# Patient Record
Sex: MALE | Race: WHITE | Employment: FULL TIME | URBAN - METROPOLITAN AREA
[De-identification: names, ages, dates, MRNs, and addresses within clinical notes are randomized per-mention and may not be internally consistent; named-entity substitution may affect disease eponyms.]

---

## 2017-07-07 ENCOUNTER — TELEPHONE (OUTPATIENT)
Dept: FAMILY MEDICINE CLINIC | Facility: CLINIC | Age: 42
End: 2017-07-07

## 2017-07-07 NOTE — TELEPHONE ENCOUNTER
FERNANDO for pt to CB. Pt was in the urgent care at [facility] on 07/04/2017 for left knee redness. He was diagnosed with cellulitis. Dr. Gael Larson would like an update on his condition.

## 2017-07-10 NOTE — TELEPHONE ENCOUNTER
Call to pt's cell reaches identified voice mail. Per hipaa consent, left vmm advising dr Amirah Torres req call back to triage nurse tomorrow with condition update since seen at [facility] 7/4/17. Provided ofc phone hours.

## 2017-07-14 NOTE — TELEPHONE ENCOUNTER
TPhilc. To pt. Cell # [phone]. Pt states\" the bump on my knee is gone. There is no redness or pain. I have about 2 days of the antibiotic  left. \" I advised him to finish the antibiotic and if he has any return of symptoms, questions or concerns please c

## 2017-12-23 ENCOUNTER — HOSPITAL ENCOUNTER (EMERGENCY)
Facility: HOSPITAL | Age: 42
Discharge: HOME | End: 2017-12-23
Attending: EMERGENCY MEDICINE
Payer: COMMERCIAL

## 2017-12-23 ENCOUNTER — APPOINTMENT (OUTPATIENT)
Dept: GENERAL RADIOLOGY | Facility: HOSPITAL | Age: 42
End: 2017-12-23
Payer: COMMERCIAL

## 2017-12-23 VITALS
HEART RATE: 61 BPM | BODY MASS INDEX: 29.52 KG/M2 | DIASTOLIC BLOOD PRESSURE: 98 MMHG | HEIGHT: 77 IN | SYSTOLIC BLOOD PRESSURE: 125 MMHG | RESPIRATION RATE: 18 BRPM | TEMPERATURE: 98 F | OXYGEN SATURATION: 100 % | WEIGHT: 250 LBS

## 2017-12-23 DIAGNOSIS — S93.141A: Primary | ICD-10-CM

## 2017-12-23 PROCEDURE — 99283 EMERGENCY DEPT VISIT LOW MDM: CPT

## 2017-12-23 PROCEDURE — 73630 X-RAY EXAM OF FOOT: CPT | Performed by: EMERGENCY MEDICINE

## 2017-12-23 NOTE — ED PROVIDER NOTES
Patient Seen in: [facility] Emergency Department    History   Patient presents with:  Lower Extremity Injury (musculoskeletal)    Stated Complaint: \"toe dislocation\"     HPI    Patient is a pleasant 47-year-old male, presenting for evaluation of r the first MTP joint. No obvious dislocation. Neurological: He is alert and oriented to person, place, and time. Skin: Skin is warm and dry. Psychiatric: He has a normal mood and affect.  His behavior is normal. Judgment and thought content normal. and ambulate, which he was unable to do upon arrival.    Suspected toe subluxation reviewed. Supportive care instructions reviewed. Patient should take ibuprofen and ice/elevate his foot when able.   Careful weightbearing may be continued as tolerated

## 2018-01-10 ENCOUNTER — OFFICE VISIT (OUTPATIENT)
Dept: FAMILY MEDICINE CLINIC | Facility: CLINIC | Age: 43
End: 2018-01-10

## 2018-01-10 VITALS
OXYGEN SATURATION: 97 % | WEIGHT: 250 LBS | BODY MASS INDEX: 28.93 KG/M2 | RESPIRATION RATE: 16 BRPM | DIASTOLIC BLOOD PRESSURE: 70 MMHG | SYSTOLIC BLOOD PRESSURE: 110 MMHG | HEART RATE: 107 BPM | HEIGHT: 78 IN | TEMPERATURE: 102 F

## 2018-01-10 DIAGNOSIS — R68.89 INFLUENZA-LIKE SYMPTOMS: Primary | ICD-10-CM

## 2018-01-10 DIAGNOSIS — H65.192 OTHER ACUTE NONSUPPURATIVE OTITIS MEDIA OF LEFT EAR, RECURRENCE NOT SPECIFIED: ICD-10-CM

## 2018-01-10 PROCEDURE — 99202 OFFICE O/P NEW SF 15 MIN: CPT | Performed by: NURSE PRACTITIONER

## 2018-01-10 RX ORDER — OSELTAMIVIR PHOSPHATE 75 MG/1
75 CAPSULE ORAL 2 TIMES DAILY
Qty: 10 CAPSULE | Refills: 0 | Status: SHIPPED | OUTPATIENT
Start: 2018-01-10 | End: 2018-01-15

## 2018-01-10 RX ORDER — AMOXICILLIN 875 MG/1
875 TABLET, COATED ORAL 2 TIMES DAILY
Qty: 20 TABLET | Refills: 0 | Status: SHIPPED | OUTPATIENT
Start: 2018-01-10 | End: 2018-01-20

## 2018-01-10 NOTE — PATIENT INSTRUCTIONS
· It is very important to complete full course of antibiotic. · Tamiflu as prescribed. · Lots of fluids and rest  · Robitussin DM for cough as packet insert  · May also try Flonase OTC for nasal symptoms as packet insert.    · Acetaminophen or ibuprofen Antibiotic medicine is a common treatment for ear infections. However, recent studies have shown that the symptoms of ear infections often go away in a couple of days without antibiotics.  Bacteria can become resistant to antibiotics, and the medicine may c If you have a fever:   Rest until your temperature has fallen below 100°F (37.8°C). Then become as active as is comfortable. Ask your provider if you can take aspirin, acetaminophen, or ibuprofen to control your fever.  Anyone under the age of 24 with a v The flu starts 1 to 3 days after you are exposed to the flu virus. It may last for 1 to 2 weeks but many people feel tired or fatigued for many weeks afterward. You usually don’t need to take antibiotics unless you have a complication.  This might be an ear · Cough with lots of colored mucus (sputum) or blood in your mucus  · Chest pain, shortness of breath, wheezing, or trouble breathing  · Severe headache, or face, neck, or ear pain  · New rash with fever  · Fever of 100.4°F (38°C) or higher, or as directed

## 2018-01-10 NOTE — PROGRESS NOTES
Patient presents with:  Flu  :    HPI:   Da Sarmiento is a 38year old male who presents for upper respiratory symptoms for  3  days. Started suddenly. Patient originally started taking left over Tamilfu and had 3 pills.   He left for his job GENERAL: well developed, well nourished, in no apparent distress, acutely sick. SKIN: no rashes,no suspicious lesions, flushed. Warm to touch.   HEAD: atraumatic, normocephalic,  no tenderness on palpation of sinuses  EYES: conjunctiva clear, EOM intact  E - Oseltamivir Phosphate 75 MG Oral Cap; Take 1 capsule (75 mg total) by mouth 2 (two) times daily. Dispense: 10 capsule; Refill: 0    2. Other acute nonsuppurative otitis media of left ear, recurrence not specified  Likely secondary to flu symptoms.   Medi Ear infections usually begin with a viral infection of the nose and throat. For example, a cold might lead to an infection of the ear. Ear infections may also occur when you have allergies.  The viral infection or allergic reaction can cause swelling of the How long will the effects last?   In most cases you should feel better in 2 to 3 days.    If you are taking an antibiotic and your eardrum has not returned to normal when your provider examines you again, you may need to take a different antibiotic or other Keep all your appointments. Your healthcare provider may want you to have one or more follow-up exams until signs of inflammation and infection have disappeared. How can I prevent an ear infection from occurring?    If you tend to get ear infections often · Nausea and loss of appetite are common with the flu. Eat light meals. Drink 6 to 8 glasses of liquids every day. Good choices are water, sport drinks, soft drinks without caffeine, juices, tea, and soup.  Extra fluids will also help loosen secretions in y

## 2019-04-09 ENCOUNTER — TELEPHONE (OUTPATIENT)
Dept: FAMILY MEDICINE CLINIC | Facility: CLINIC | Age: 44
End: 2019-04-09

## 2019-04-09 ENCOUNTER — APPOINTMENT (OUTPATIENT)
Dept: CT IMAGING | Facility: HOSPITAL | Age: 44
End: 2019-04-09
Attending: EMERGENCY MEDICINE
Payer: COMMERCIAL

## 2019-04-09 ENCOUNTER — HOSPITAL ENCOUNTER (EMERGENCY)
Facility: HOSPITAL | Age: 44
Discharge: HOME | End: 2019-04-09
Attending: EMERGENCY MEDICINE
Payer: COMMERCIAL

## 2019-04-09 VITALS
BODY MASS INDEX: 30.31 KG/M2 | TEMPERATURE: 98 F | WEIGHT: 262 LBS | HEART RATE: 60 BPM | HEIGHT: 78 IN | OXYGEN SATURATION: 98 % | SYSTOLIC BLOOD PRESSURE: 129 MMHG | RESPIRATION RATE: 16 BRPM | DIASTOLIC BLOOD PRESSURE: 79 MMHG

## 2019-04-09 DIAGNOSIS — R51.9 ACUTE NONINTRACTABLE HEADACHE, UNSPECIFIED HEADACHE TYPE: Primary | ICD-10-CM

## 2019-04-09 PROCEDURE — 99284 EMERGENCY DEPT VISIT MOD MDM: CPT

## 2019-04-09 PROCEDURE — 36415 COLL VENOUS BLD VENIPUNCTURE: CPT

## 2019-04-09 PROCEDURE — 80053 COMPREHEN METABOLIC PANEL: CPT | Performed by: EMERGENCY MEDICINE

## 2019-04-09 PROCEDURE — 70496 CT ANGIOGRAPHY HEAD: CPT | Performed by: EMERGENCY MEDICINE

## 2019-04-09 PROCEDURE — 85025 COMPLETE CBC W/AUTO DIFF WBC: CPT | Performed by: EMERGENCY MEDICINE

## 2019-04-09 PROCEDURE — 99285 EMERGENCY DEPT VISIT HI MDM: CPT

## 2019-04-09 PROCEDURE — 70450 CT HEAD/BRAIN W/O DYE: CPT | Performed by: EMERGENCY MEDICINE

## 2019-04-09 PROCEDURE — 85730 THROMBOPLASTIN TIME PARTIAL: CPT | Performed by: EMERGENCY MEDICINE

## 2019-04-09 PROCEDURE — 85610 PROTHROMBIN TIME: CPT | Performed by: EMERGENCY MEDICINE

## 2019-04-09 NOTE — ED NOTES
Pt reevaluated by er physician, informed pt of all his test reports and plan of care.  Pt verbalizing understanding

## 2019-04-09 NOTE — TELEPHONE ENCOUNTER
Spoke to wife Maurice Anne that pt needs to be evaluated in the ER for thunderclap h/a     With verbalized understanding

## 2019-04-09 NOTE — ED PROVIDER NOTES
Patient Seen in: [facility] Emergency Department    History   Patient presents with:  Headache (neurologic)    Stated Complaint: sudden headache that started yesterday. A&OX 4. Ambulatory. No trauma or injury.     HPI    Patient is a 46-year-old male Pulmonary/Chest: Effort normal and breath sounds normal.   Abdominal: Soft. Bowel sounds are normal.   Musculoskeletal: Normal range of motion. Neurological: He is alert and oriented to person, place, and time. Skin: Skin is warm and dry.    Psychiatr completely gone away but is much better. Will check basic labs, CT brain to evaluate for intracranial hemorrhage. Discussed with the patient and his wife that we may need to rule out subarachnoid hemorrhage.        Update at 2:45 PM.  CT brain is negative

## 2019-04-09 NOTE — ED INITIAL ASSESSMENT (HPI)
Pt states he had an episode of sudden onset of severe headache yesterday at noon that lasted for approx 3 minutes and went away. just a mild headache at this time. No injury.

## 2019-04-11 ENCOUNTER — OFFICE VISIT (OUTPATIENT)
Dept: NEUROLOGY | Facility: CLINIC | Age: 44
End: 2019-04-11
Payer: COMMERCIAL

## 2019-04-11 VITALS
SYSTOLIC BLOOD PRESSURE: 131 MMHG | BODY MASS INDEX: 30.31 KG/M2 | RESPIRATION RATE: 16 BRPM | DIASTOLIC BLOOD PRESSURE: 85 MMHG | WEIGHT: 262 LBS | HEART RATE: 71 BPM | HEIGHT: 78 IN

## 2019-04-11 DIAGNOSIS — G44.82 HEADACHE ASSOCIATED WITH SEXUAL ACTIVITY: Primary | ICD-10-CM

## 2019-04-11 PROCEDURE — 1111F DSCHRG MED/CURRENT MED MERGE: CPT | Performed by: OTHER

## 2019-04-11 PROCEDURE — 99204 OFFICE O/P NEW MOD 45 MIN: CPT | Performed by: OTHER

## 2019-04-11 RX ORDER — INDOMETHACIN 25 MG/1
25 CAPSULE ORAL
Qty: 90 CAPSULE | Refills: 0 | Status: SHIPPED | OUTPATIENT
Start: 2019-04-11

## 2019-04-11 NOTE — H&P
[facility] New Patient / Consult Visit    Manuel Ding is a 41year old male.                          Referring MD: None  Patient presents with:  Hospital F/U: Headaches during intercourse      HPI:    Manuel Ding is a 41 recent weight change, fevers, chills, nausea, double vision/ blurry vision / loss of vision, chest pain, palpitations, shortness of breath, rashes, joint pains, bowel / bladder incontinence or mood issues.      Past Medical History:   Diagnosis Date   • Vis Pupils: equally round and reactive to light with direct and consensual responses, normal accomodation   Visual acuity: Normal              Visual fields: Normal  Oculomotor/Trochlear/Abducens:    Eye Movements: EOMI without nystagmus  Trigeminal:   Faci are unremarkable. The basilar artery has a normal course and caliber. The bilateral vertebral arteries are unremarkable. The origins of the bilateral PICA are seen.         =====  CONCLUSION:    1. No acute intracranial process.   2. No high-grade stenos Sodium      136 - 145 mmol/L 140   Potassium      3.5 - 5.1 mmol/L 4.2   Chloride      98 - 112 mmol/L 107   Carbon Dioxide, Total      21.0 - 32.0 mmol/L 26.0   ANION GAP      0 - 18 mmol/L 7   BUN      7 - 18 mg/dL 14   CREATININE      0.70 - 1.30 mg/d with the patient and family, and recommended patient trial indomethacin 25-50 mg 1 hour prior to sexual activity. If this does not improve headaches, patient may try 25 mg 3 times daily for prevention purposes.   Patient was advised if he is taking indomet

## 2019-05-08 ENCOUNTER — OFFICE VISIT (OUTPATIENT)
Dept: NEUROLOGY | Facility: CLINIC | Age: 44
End: 2019-05-08
Payer: COMMERCIAL

## 2019-05-08 VITALS
HEART RATE: 62 BPM | WEIGHT: 262 LBS | RESPIRATION RATE: 16 BRPM | DIASTOLIC BLOOD PRESSURE: 80 MMHG | BODY MASS INDEX: 30.31 KG/M2 | HEIGHT: 78 IN | SYSTOLIC BLOOD PRESSURE: 111 MMHG

## 2019-05-08 DIAGNOSIS — G44.82 HEADACHE ASSOCIATED WITH SEXUAL ACTIVITY: Primary | ICD-10-CM

## 2019-05-08 DIAGNOSIS — R47.89 WORD FINDING DIFFICULTY: ICD-10-CM

## 2019-05-08 DIAGNOSIS — R41.0 TRANSIENT CONFUSION: ICD-10-CM

## 2019-05-08 PROCEDURE — 99214 OFFICE O/P EST MOD 30 MIN: CPT | Performed by: OTHER

## 2019-05-08 NOTE — PROGRESS NOTES
[facility] Progress Note    HPI  Patient presents with:  Headache: Follow up      As per my initial H&P from 4/11/19:,   \" Gillian Torres is a 41year old, who presents for evaluation of headaches.       Patient states he was in [place] vision, chest pain, palpitations, shortness of breath, rashes, joint pains, bowel / bladder incontinence or mood issues.  \"        Patient since last visit has been doing better overall - had persistent headaches for about a week after last visit but this kg/m²   Estimated body mass index is 30.28 kg/m² as calculated from the following:    Height as of this encounter: 78\". Weight as of this encounter: 262 lb.     Gen: well developed, well nourished, no acute distress  HEENT: normocephalic  Heart; normal If this does not improve headaches, patient may try 25 mg 3 times daily for prevention purposes. Patient was advised if he is taking indomethacin on a regular basis, he should minimize use of other NSAIDs, due to increased risk for GI discomfort/ulcers.

## 2023-03-02 ENCOUNTER — TELEPHONE (OUTPATIENT)
Dept: FAMILY MEDICINE CLINIC | Facility: CLINIC | Age: 48
End: 2023-03-02

## 2023-03-05 ENCOUNTER — PATIENT OUTREACH (OUTPATIENT)
Dept: CASE MANAGEMENT | Age: 48
End: 2023-03-05

## 2023-03-05 NOTE — PROCEDURES
The office order for PCP removal request is Approved and finalized on March 5, 2023.     Thanks,  [facility] [employer]

## 2024-05-22 ENCOUNTER — HOSPITAL ENCOUNTER (EMERGENCY)
Facility: HOSPITAL | Age: 49
Discharge: HOME | End: 2024-05-23
Attending: EMERGENCY MEDICINE

## 2024-05-22 DIAGNOSIS — S00.219A SCRATCH OF EYE REGION: ICD-10-CM

## 2024-05-22 DIAGNOSIS — S01.81XA FACIAL LACERATION, INITIAL ENCOUNTER: Primary | ICD-10-CM

## 2024-05-22 DIAGNOSIS — H11.31 SUBCONJUNCTIVAL HEMORRHAGE OF RIGHT EYE: ICD-10-CM

## 2024-05-22 PROCEDURE — 12001 RPR S/N/AX/GEN/TRNK 2.5CM/<: CPT

## 2024-05-22 PROCEDURE — 99283 EMERGENCY DEPT VISIT LOW MDM: CPT

## 2024-05-22 PROCEDURE — 90471 IMMUNIZATION ADMIN: CPT

## 2024-05-22 RX ORDER — TETRACAINE HYDROCHLORIDE 5 MG/ML
1 SOLUTION OPHTHALMIC ONCE
Status: COMPLETED | OUTPATIENT
Start: 2024-05-22 | End: 2024-05-22

## 2024-05-23 VITALS
HEIGHT: 77 IN | SYSTOLIC BLOOD PRESSURE: 152 MMHG | TEMPERATURE: 98 F | RESPIRATION RATE: 16 BRPM | OXYGEN SATURATION: 97 % | WEIGHT: 247 LBS | BODY MASS INDEX: 29.16 KG/M2 | HEART RATE: 82 BPM | DIASTOLIC BLOOD PRESSURE: 105 MMHG

## 2024-05-23 RX ORDER — LEVOFLOXACIN 5 MG/ML
2 SOLUTION/ DROPS TOPICAL EVERY 4 HOURS
Qty: 5 ML | Refills: 0 | Status: SHIPPED | OUTPATIENT
Start: 2024-05-23 | End: 2024-05-28

## 2024-05-23 NOTE — ED INITIAL ASSESSMENT (HPI)
States while playing basketball he was accidentally hit in the R eye with another player's hand, thinks he may have been scratched in the eye. Reports some blurred vision. No LOC or vomiting.

## 2024-05-23 NOTE — ED PROVIDER NOTES
Patient Seen in: [facility] Emergency Department      History     Chief Complaint   Patient presents with    Eye Visual Problem     Stated Complaint: hit in eye during basketball, denies eye loss of vision    Subjective:   HPI  Patient is a 49 yo M otherwise healthy who presents to ED for evaluation of R eye pain. Patient was playing basketball when he was on ground reaching for ball and another player hit his eye. Pt initially had blurry vision but this resolved. Pt sustained small laceration inferior to R eye. Denies hitting head or LOC. No other injuries. Unknown last tetanus.     Objective:   Past Medical History:    Visual impairment              History reviewed. No pertinent surgical history.             Social History     Socioeconomic History    Marital status:    Tobacco Use    Smoking status: Never    Smokeless tobacco: Never   Vaping Use    Vaping status: Never Used   Substance and Sexual Activity    Alcohol use: Yes     Comment: a beer a day    Drug use: No   Other Topics Concern    Caffeine Concern Yes     Comment: 2 daily    Exercise Yes     Comment: 1-2 times a week              Review of Systems    Positive for stated complaint: hit in eye during basketball, denies eye loss of vision  Other systems are as noted in HPI.  Constitutional and vital signs reviewed.      All other systems reviewed and negative except as noted above.    Physical Exam     ED Triage Vitals [05/22/24 2156]   BP (!) 152/105   Pulse 65   Resp 16   Temp 97.9 °F (36.6 °C)   Temp src Oral   SpO2 97 %   O2 Device None (Room air)       Current Vitals:   Vital Signs  BP: (!) 152/105  Pulse: 82  Resp: 16  Temp: 97.9 °F (36.6 °C)  Temp src: Oral    Oxygen Therapy  SpO2: 97 %  O2 Device: None (Room air)        Right Eye Chart Acuity: 20/20, Corrected  Left Eye Chart Acuity: 20/15, Corrected    Physical Exam  Vitals and nursing note reviewed.   Constitutional:       General: He is not in acute distress.  HENT:      Head:  Normocephalic.      Comments: 0.5 cm superficial laceration inferior to R eye, no involvement of eyelid. Surrounding ecchymoses noted    R lateral subconjunctival hemorrhage    No significant periorbital edema. No proptosis. No hyphema    EOMI, PERRL     Nose: Nose normal.      Mouth/Throat:      Mouth: Mucous membranes are moist.   Eyes:      Extraocular Movements: Extraocular movements intact.      Pupils: Pupils are equal, round, and reactive to light.   Cardiovascular:      Rate and Rhythm: Normal rate and regular rhythm.      Pulses: Normal pulses.   Pulmonary:      Effort: Pulmonary effort is normal. No respiratory distress.      Breath sounds: No wheezing.   Abdominal:      General: There is no distension.      Palpations: Abdomen is soft.   Musculoskeletal:         General: No swelling. Normal range of motion.      Cervical back: Normal range of motion.   Skin:     General: Skin is warm and dry.      Capillary Refill: Capillary refill takes less than 2 seconds.   Neurological:      General: No focal deficit present.      Mental Status: He is alert.   Psychiatric:         Mood and Affect: Mood normal.           ED Course   Labs Reviewed - No data to display          Procedure Note:  Laceration was anesthetized with none. The wound was cleansed and irrigated copiously.  There was no visible foreign body within the wound. The wound was closed dermabond. The quality of the closure was excellent         MDM      Patient is a 47 yo M otherwise healthy who presents to ED for evaluation of R eye pain after being hit by another player while playing basketball. No head injury or LOC. VSS.     PERRL, EOMI. No proptosis. No hyphema.     Pt has subconjunctival hemorrhage. No obvious corneal abrasion. However due to concern that player scratched eye prescribed prophylactic eye drops (pt wears contacts).     Superficial laceration inferior to R eye was irrigated and repaired with dermabond. Tetanus updated. Discussed wound  care, return precautions for signs of infection.     Provided with optho f/u as needed. Recommended f/u with PCP. Pt verbalized understanding and agreement of plan.         Disposition and Plan     Clinical Impression:  1. Facial laceration, initial encounter    2. Scratch of eye region    3. Subconjunctival hemorrhage of right eye         Disposition:  Discharge  5/23/2024 12:45 am    Follow-up:  Nonstaff, Physician    Call in 1 day(s)      Darian Heller MD  [address]  [address]  [place] [address]  [phone]    Call            Medications Prescribed:  Discharge Medication List as of 5/23/2024 12:51 AM        START taking these medications    Details   levoFLOXacin 0.5 % Ophthalmic Solution Place 2 drops into the right eye every 4 (four) hours for 5 days., Normal, Disp-5 mL, R-0

## 2024-05-23 NOTE — DISCHARGE INSTRUCTIONS
You were seen in the emergency department for facial laceration which was repaired with glue.  Your tetanus was updated.  If you notice signs of infection such as fevers, pus draining from the wound or spreading redness please return to the ER for wound evaluation.  Apply ice to the eye multiple times throughout the day.  Prescribed you an antibiotic eyedrop different infection.  You can take Tylenol or Advil as needed for pain.  Follow-up with ophthalmology as needed.  Please also follow-up with your primary care physician.

## (undated) NOTE — LETTER
04/12/19    Dear Dr. Ned Garcia MD        I saw your patient, Loran Kehr for an initial visit. Please see my H&P note enclosed below. Let me know if you have any questions.     Thank you  Riccardo Skelton MD, Neurology  [address]  of the week - had intercourse several days during his trip without headaches previously and never previously had similar headaches; no history of migraines or frequent headaches.      He has tried Ibuprofen and Excedrin since his headaches started and noted EYES: sclera anicteric, conjunctiva normal  HEENT: normocephalic  CARDIOVASCULAR: S1, S2 normal, RRR  LUNGS: clear to auscultation bilaterally  EXTREMITIES: no cyanosis, peripheral pulses intact    Neck: Supple; full range of motion; no carotid bruits    M No acute intracranial hemorrhage. Mildly prominent cisterna magna. There is no evident fracture. Mild to moderate mucosal thickening within the right maxillary sinus. Mild mucosal thickening within left maxillary sinus and ethmoid sinus.      The upp 1.50 - 7.70 x10 (3) uL 3.79   Neutrophils Absolute      1.50 - 7.70 x10(3) uL 3.79   Lymphocytes Absolute      1.00 - 4.00 x10(3) uL 1.46   Monocytes Absolute      0.10 - 1.00 x10(3) uL 0.57   Eosinophils Absolute      0.00 - 0.70 x10(3) uL 0.10   [place] with CT brain and CTA of the brain demonstrating no evidence for aneurysm or intracranial hemorrhage or subarachnoid hemorrhage.   Given the fact the patient had recent normal imaging, and has had improvement in his headaches, I feel this is most likely a h

## (undated) NOTE — ED AVS SNAPSHOT
Jaylyn Carrera   MRN: [id number]    Department:  [facility] Emergency Department   Date of Visit:  4/9/2019           Disclosure     Insurance plans vary and the physician(s) referred by the ER may not be covered by your plan.  Please contact tell this physician (or your personal doctor if your instructions are to return to your personal doctor) about any new or lasting problems. The primary care or specialist physician will see patients referred from the [facility] Emergency Department.  Severo Pastures